# Patient Record
Sex: FEMALE | Race: BLACK OR AFRICAN AMERICAN | Employment: OTHER | URBAN - METROPOLITAN AREA
[De-identification: names, ages, dates, MRNs, and addresses within clinical notes are randomized per-mention and may not be internally consistent; named-entity substitution may affect disease eponyms.]

---

## 2017-04-26 ENCOUNTER — APPOINTMENT (OUTPATIENT)
Dept: GENERAL RADIOLOGY | Age: 77
End: 2017-04-26
Attending: INTERNAL MEDICINE
Payer: MEDICARE

## 2017-04-26 ENCOUNTER — HOSPITAL ENCOUNTER (OUTPATIENT)
Dept: NON INVASIVE DIAGNOSTICS | Age: 77
Discharge: HOME | End: 2017-04-27
Attending: INTERNAL MEDICINE | Admitting: INTERNAL MEDICINE
Payer: MEDICARE

## 2017-04-26 LAB
GLUCOSE BLD STRIP.AUTO-MCNC: 101 MG/DL (ref 65–100)
GLUCOSE BLD STRIP.AUTO-MCNC: 103 MG/DL (ref 65–100)
GLUCOSE BLD STRIP.AUTO-MCNC: 138 MG/DL (ref 65–100)
SERVICE CMNT-IMP: ABNORMAL

## 2017-04-26 PROCEDURE — C1892 INTRO/SHEATH,FIXED,PEEL-AWAY: HCPCS

## 2017-04-26 PROCEDURE — 74011000250 HC RX REV CODE- 250

## 2017-04-26 PROCEDURE — 74011250636 HC RX REV CODE- 250/636

## 2017-04-26 PROCEDURE — 74011250636 HC RX REV CODE- 250/636: Performed by: INTERNAL MEDICINE

## 2017-04-26 PROCEDURE — C1785 PMKR, DUAL, RATE-RESP: HCPCS

## 2017-04-26 PROCEDURE — 71010 XR CHEST PORT: CPT

## 2017-04-26 PROCEDURE — 77030031139 HC SUT VCRL2 J&J -A

## 2017-04-26 PROCEDURE — C1894 INTRO/SHEATH, NON-LASER: HCPCS

## 2017-04-26 PROCEDURE — 74011636320 HC RX REV CODE- 636/320

## 2017-04-26 PROCEDURE — 77030010507 HC ADH SKN DERMBND J&J -B

## 2017-04-26 PROCEDURE — 77030002996 HC SUT SLK J&J -A

## 2017-04-26 PROCEDURE — 33208 INSRT HEART PM ATRIAL & VENT: CPT

## 2017-04-26 PROCEDURE — L3670 SO ACRO/CLAV CAN WEB PRE OTS: HCPCS

## 2017-04-26 PROCEDURE — 82962 GLUCOSE BLOOD TEST: CPT

## 2017-04-26 PROCEDURE — C1898 LEAD, PMKR, OTHER THAN TRANS: HCPCS

## 2017-04-26 PROCEDURE — 77030018836 HC SOL IRR NACL ICUM -A

## 2017-04-26 RX ORDER — VANCOMYCIN HYDROCHLORIDE 1 G/20ML
INJECTION, POWDER, LYOPHILIZED, FOR SOLUTION INTRAVENOUS
Status: DISCONTINUED
Start: 2017-04-26 | End: 2017-04-26 | Stop reason: ALTCHOICE

## 2017-04-26 RX ORDER — OXYCODONE AND ACETAMINOPHEN 5; 325 MG/1; MG/1
1 TABLET ORAL
Status: DISCONTINUED | OUTPATIENT
Start: 2017-04-26 | End: 2017-04-27 | Stop reason: HOSPADM

## 2017-04-26 RX ORDER — MIDAZOLAM HYDROCHLORIDE 1 MG/ML
INJECTION, SOLUTION INTRAMUSCULAR; INTRAVENOUS
Status: COMPLETED
Start: 2017-04-26 | End: 2017-04-26

## 2017-04-26 RX ORDER — MIDAZOLAM HYDROCHLORIDE 1 MG/ML
1-5 INJECTION, SOLUTION INTRAMUSCULAR; INTRAVENOUS
Status: DISCONTINUED | OUTPATIENT
Start: 2017-04-26 | End: 2017-04-26 | Stop reason: ALTCHOICE

## 2017-04-26 RX ORDER — SODIUM CHLORIDE 900 MG/100ML
INJECTION INTRAVENOUS
Status: DISCONTINUED
Start: 2017-04-26 | End: 2017-04-26 | Stop reason: ALTCHOICE

## 2017-04-26 RX ORDER — SODIUM CHLORIDE 0.9 % (FLUSH) 0.9 %
5-10 SYRINGE (ML) INJECTION EVERY 8 HOURS
Status: DISCONTINUED | OUTPATIENT
Start: 2017-04-26 | End: 2017-04-27 | Stop reason: HOSPADM

## 2017-04-26 RX ORDER — FENTANYL CITRATE 50 UG/ML
INJECTION, SOLUTION INTRAMUSCULAR; INTRAVENOUS
Status: COMPLETED
Start: 2017-04-26 | End: 2017-04-26

## 2017-04-26 RX ORDER — BACITRACIN 50000 [IU]/1
50000 INJECTION, POWDER, FOR SOLUTION INTRAMUSCULAR ONCE
Status: COMPLETED | OUTPATIENT
Start: 2017-04-26 | End: 2017-04-26

## 2017-04-26 RX ORDER — ACETAMINOPHEN 325 MG/1
650 TABLET ORAL
Status: DISCONTINUED | OUTPATIENT
Start: 2017-04-26 | End: 2017-04-27 | Stop reason: HOSPADM

## 2017-04-26 RX ORDER — FENTANYL CITRATE 50 UG/ML
12.5-5 INJECTION, SOLUTION INTRAMUSCULAR; INTRAVENOUS
Status: DISCONTINUED | OUTPATIENT
Start: 2017-04-26 | End: 2017-04-26 | Stop reason: ALTCHOICE

## 2017-04-26 RX ORDER — LIDOCAINE HYDROCHLORIDE AND EPINEPHRINE 10; 10 MG/ML; UG/ML
1-20 INJECTION, SOLUTION INFILTRATION; PERINEURAL
Status: DISCONTINUED | OUTPATIENT
Start: 2017-04-26 | End: 2017-04-26 | Stop reason: ALTCHOICE

## 2017-04-26 RX ORDER — LIDOCAINE HYDROCHLORIDE AND EPINEPHRINE 10; 10 MG/ML; UG/ML
INJECTION, SOLUTION INFILTRATION; PERINEURAL
Status: COMPLETED
Start: 2017-04-26 | End: 2017-04-26

## 2017-04-26 RX ORDER — ONDANSETRON 2 MG/ML
4 INJECTION INTRAMUSCULAR; INTRAVENOUS
Status: DISCONTINUED | OUTPATIENT
Start: 2017-04-26 | End: 2017-04-27 | Stop reason: HOSPADM

## 2017-04-26 RX ORDER — HEPARIN SODIUM 200 [USP'U]/100ML
INJECTION, SOLUTION INTRAVENOUS
Status: COMPLETED
Start: 2017-04-26 | End: 2017-04-26

## 2017-04-26 RX ORDER — SODIUM CHLORIDE 0.9 % (FLUSH) 0.9 %
5-10 SYRINGE (ML) INJECTION AS NEEDED
Status: DISCONTINUED | OUTPATIENT
Start: 2017-04-26 | End: 2017-04-27 | Stop reason: HOSPADM

## 2017-04-26 RX ORDER — HEPARIN SODIUM 200 [USP'U]/100ML
500 INJECTION, SOLUTION INTRAVENOUS ONCE
Status: COMPLETED | OUTPATIENT
Start: 2017-04-26 | End: 2017-04-26

## 2017-04-26 RX ORDER — BACITRACIN 50000 [IU]/1
INJECTION, POWDER, FOR SOLUTION INTRAMUSCULAR
Status: COMPLETED
Start: 2017-04-26 | End: 2017-04-26

## 2017-04-26 RX ADMIN — MIDAZOLAM HYDROCHLORIDE 2 MG: 1 INJECTION, SOLUTION INTRAMUSCULAR; INTRAVENOUS at 17:25

## 2017-04-26 RX ADMIN — IOPAMIDOL 20 ML: 755 INJECTION, SOLUTION INTRAVENOUS at 17:38

## 2017-04-26 RX ADMIN — HEPARIN SODIUM 1000 UNITS: 200 INJECTION, SOLUTION INTRAVENOUS at 17:15

## 2017-04-26 RX ADMIN — MIDAZOLAM HYDROCHLORIDE 2 MG: 1 INJECTION INTRAMUSCULAR; INTRAVENOUS at 17:15

## 2017-04-26 RX ADMIN — FENTANYL CITRATE 50 MCG: 50 INJECTION, SOLUTION INTRAMUSCULAR; INTRAVENOUS at 17:15

## 2017-04-26 RX ADMIN — BACITRACIN 50000 UNITS: 50000 INJECTION, POWDER, FOR SOLUTION INTRAMUSCULAR at 18:06

## 2017-04-26 RX ADMIN — VANCOMYCIN HYDROCHLORIDE 1000 MG: 1 INJECTION, POWDER, LYOPHILIZED, FOR SOLUTION INTRAVENOUS at 17:10

## 2017-04-26 RX ADMIN — MIDAZOLAM HYDROCHLORIDE 2 MG: 1 INJECTION, SOLUTION INTRAMUSCULAR; INTRAVENOUS at 17:43

## 2017-04-26 RX ADMIN — MIDAZOLAM HYDROCHLORIDE 1 MG: 1 INJECTION, SOLUTION INTRAMUSCULAR; INTRAVENOUS at 17:37

## 2017-04-26 RX ADMIN — MIDAZOLAM HYDROCHLORIDE 2 MG: 1 INJECTION, SOLUTION INTRAMUSCULAR; INTRAVENOUS at 18:05

## 2017-04-26 RX ADMIN — FENTANYL CITRATE 50 MCG: 50 INJECTION, SOLUTION INTRAMUSCULAR; INTRAVENOUS at 17:37

## 2017-04-26 RX ADMIN — LIDOCAINE HYDROCHLORIDE AND EPINEPHRINE 20 ML: 10; 10 INJECTION, SOLUTION INFILTRATION; PERINEURAL at 17:39

## 2017-04-26 RX ADMIN — LIDOCAINE HYDROCHLORIDE,EPINEPHRINE BITARTRATE 20 ML: 10; .01 INJECTION, SOLUTION INFILTRATION; PERINEURAL at 17:39

## 2017-04-26 RX ADMIN — BACITRACIN 50000 UNITS: 5000 INJECTION, POWDER, FOR SOLUTION INTRAMUSCULAR at 18:06

## 2017-04-26 RX ADMIN — MIDAZOLAM HYDROCHLORIDE 2 MG: 1 INJECTION, SOLUTION INTRAMUSCULAR; INTRAVENOUS at 17:15

## 2017-04-26 NOTE — PROCEDURES
[address]  [phone]    Patient ID:  Patient: Brandy Mccann  MRN: [id number]  Age: 78 y.o.  : 1940  Gender: female  Study Date: 2017    History: This is a female with nonreversible symptomatic sinus node dysfunction here for permanent pacemaker implant. Procedures Performed:  1. DUAL CHAMBER PACEMAKER (98250)    The patient was brought to the EP lab in a postabsorptive state after informed consent had been previously obtained. Continuous electrocardiographic and hemodynamic monitoring was performed. Sedation was performed by the nurse who was in constant attendance and my supervision throughout the procedure. Versed 9 mg and fentanyl 100 mcg was used, sedation start time 17:10, end time 18:10 (60 minutes). Using 1% lidocaine with epinephrine, the right chest site was anesthetized. The pocket was formed in the usual fashion and ultimately axillary venous access was obtained using a micropuncture needle. Contrast venography of the right upper extremity was used to facilitate (10 cc). Two safe sheaths were placed. The right ventricular lead (Solia S 53) was advanced to the RV apex. The right atrial lead (Solia S 45) was advanced to the RA appendage. Each lead was fixed and tested, performance verified. The leads were anchored to the pocket floor using two 0-silk sutures at each anchor sleeve. The pulse generator was connected to the leads and placed in the pocket after hemostasis was confirmed. Vigorous irrigation with antibiotic solution was performed. A single 0-silk suture was used to anchor the pulse generator to the pocket floor. The pocket was closed using a running 2-0 Vicryl layer x1, followed by a more superficial layer of running 4-0 Vicryl in a subcuticular fashion to close the skin. Final fluoroscopic check revealed adequate redundancy of the leads and the absence of pneumothorax.   Dermabond was applied. Preoperative Diagnosis: As above. Postoperative Diagnosis: As above. Procedure:  As above. Surgeon(s) and Role:  Smita Wright MD - Primary   Anesthesia:   MAC. Estimated Blood Loss:  <5 cc. Specimens: * No specimens in log *   Findings:  As below. Complications:  None. SETTINGS:  Kaiima 8 SAL, [id number]  P 2.4, 1.1, 455  R 13.7, 1.2, 788  DDD , CLS algorithm enabled    X-ray time:  9.2 minutes    Recommendations:  After successful dual chamber permanent pacemaker implant to the right chest using transvenous leads, routine follow-up in 2-4 weeks for wound and device check.

## 2017-04-26 NOTE — IP AVS SNAPSHOT
[address] [facility] 
[phone] Patient: Cheryl Ramos MRN: [id number] :1940 You are allergic to the following Allergen Reactions Eggshell Membrane Other (comments)  
 vomiting Penicillins Other (comments) Tree Nuts Itching Swelling and tingling of tongue Recent Documentation Height Weight Breastfeeding? BMI OB Status Smoking Status 1.575 m 66.2 kg No 26.7 kg/m2 Postmenopausal Never Smoker Emergency Contacts Name Discharge Info Relation Home Work Mobile Shawnee Haji CAREGIVER [3] Friend [5]   [phone] About your hospitalization You were admitted on:  2017 You last received care in the:  [facility] You were discharged on:  2017 Unit phone number:  [phone] Why you were hospitalized Your primary diagnosis was:  Not on File Providers Seen During Your Hospitalizations Provider Role Specialty Primary office phone Juanita Cantu MD Attending Provider Cardiology [phone] Your Primary Care Physician (PCP) Primary Care Physician Office Phone Office Fax [address] [phone] Follow-up Information Follow up With Details Comments Contact Info [address] [place] [address] Avenue 
[phone] Juanita Cantu MD  Please call and make a follow-up apponitment with Dr. Alex Michaud in 2-4 weeks. Thank you! [address] [facility] 
[phone] Current Discharge Medication List  
  
CONTINUE these medications which have NOT CHANGED Dose & Instructions Dispensing Information Comments Morning Noon Evening Bedtime  
 aspirin 81 mg chewable tablet Your last dose was: Your next dose is:    
   
   
 Dose:  81 mg Take 81 mg by mouth daily. Refills:  0  
     
   
   
   
  
 simvastatin 10 mg tablet Commonly known as:  ZOCOR Your last dose was: Your next dose is:    
   
   
 Dose:  10 mg Take 10 mg by mouth nightly. Refills:  0  
     
   
   
   
  
 valsartan-hydroCHLOROthiazide 80-12.5 mg per tablet Commonly known as:  DIOVAN-HCT Your last dose was: Your next dose is:    
   
   
 Dose:  1 Tab Take 1 Tab by mouth daily. Indications: HYPERTENSION Refills:  0 Discharge Instructions DISCHARGE INSTRUCTIONS FOR PATIENTS WITH PACEMAKERS You had a dual chamber Biotronik pacemaker implanted by Dr. Josh Clement on 4/26 due to a slow heart rate problem called sinus node dysfunction. 1. Remember to call for an appointment in 2-4 weeks [phone] to check healing and implant programming with Dr. Preston Matute nurse. 2. Medic Alert Bracelets are available from your pharmacist to wear at all times if you choose to wear one. 3. Carry your ID card for pacemaker with you at all times. This card will be given to you in the hospital or mailed to you. 4. The pacemaker will bulge slightly under your skin. The bulge will decrease in size over the next few weeks. Please notify the doctor's office if you notice any of the following around your site: A.  A bruise that does not go away. B.  Soreness or yellow, green, or brown drainage from the site. C. Any swelling from the site. D. If you have a fever of 100 degrees or higher that lasts for a few days. INCISION CARE 1.  Leave skin glue over your site until it starts to fall off, usually in a few weeks. 2.  You may shower after 3 days as long as your incision isnt submerged or directly sprayed upon until well healed. 3.  For comfort, wear loose fitting clothing.  
4.  Report any signs of infection, fever, pain, swelling, redness, oozing, or heat at site especially if these symptoms increase after the first 3 to 4 days. ACTIVITY PRECAUTIONS 1. Avoid rough contact with the implant site. 2. No driving for 14 days. 3. Avoid lifting your arm over your head, carrying anything on the affected side, or lifting over 10 pounds for 30 days. For the first 2 days only bend your arm at the elbow. 4. Any extreme activity such as golf, weight lifting or exercise biking should be restricted for 60 days. 5. Do not carry objects by holding them against your implant site. 6.  No shooting rifles or any type of gun with the affected shoulder permanently. SPECIAL PRECAUTIONS 1. You should avoid all strong magnetic fields, such as arc welding, large transformers, large motors. 2.  You may not have an MRI which uses a strong magnet to take pictures for 6 weeks. After this, MRI scanning is possible provided a protocol is followed. 3.  Treatments or surgery that requires diathermy or electrocautery should be discussed with your doctor before scheduled. 4. Avoid radio frequency transmitters, including radar. 5. Advise dentist or other medical personnel you see that you have a pacemaker. 6.  Cell phones and microwave oven use is okay. 7.  If you plan to move or take a trip to a new area, the doctor's office will give you a name of a doctor to contact for any problems. ANTIBIOTIC THERAPY During the first 8 weeks after your pacemaker insertion, you may need antibiotics before any dental work or certain tests or operations. Let the dentist or doctor who is caring for you know that you have had an implanted device. Discharge Orders None Introducing [facility]! King Kaela introduces [employer] patient portal. Now you can access parts of your medical record, email your doctor's office, and request medication refills online. 1. In your internet browser, go to [email or web address] 2. Click on the First Time User? Click Here link in the Sign In box. You will see the New Member Sign Up page. 3. Enter your [employer] Access Code exactly as it appears below. You will not need to use this code after youve completed the sign-up process. If you do not sign up before the expiration date, you must request a new code. · [employer] Access Code: [email or web address] Expires: 6/27/2017  2:23 PM 
 
4. Enter the last four digits of your Social Security Number (xxxx) and Date of Birth (mm/dd/yyyy) as indicated and click Submit. You will be taken to the next sign-up page. 5. Create a [employer] ID. This will be your [employer] login ID and cannot be changed, so think of one that is secure and easy to remember. 6. Create a [employer] password. You can change your password at any time. 7. Enter your Password Reset Question and Answer. This can be used at a later time if you forget your password. 8. Enter your e-mail address. You will receive e-mail notification when new information is available in [address] 9. Click Sign Up. You can now view and download portions of your medical record. 10. Click the Download Summary menu link to download a portable copy of your medical information. If you have questions, please visit the Frequently Asked Questions section of the [employer] website. Remember, [employer] is NOT to be used for urgent needs. For medical emergencies, dial 911. Now available from your iPhone and Android! General Information Please provide this summary of care documentation to your next provider. Patient Signature:  ____________________________________________________________ Date:  ____________________________________________________________  
  
Mirta Su Provider Signature:  ____________________________________________________________ Date:  ____________________________________________________________

## 2017-04-26 NOTE — PROGRESS NOTES
EP/ End of Procedure/ TRANSFER - OUT REPORT:    Verbal report given to LPM,RN on Cheryl Ramos for routine progression of care       Report consisted of patients Situation, Background, Assessment and   Recommendations(SBAR). Information from the following report(s) SBAR, Kardex, Procedure Summary and MAR was reviewed with the receiving nurse. Opportunity for questions and clarification was provided.

## 2017-04-27 VITALS
WEIGHT: 146 LBS | OXYGEN SATURATION: 100 % | RESPIRATION RATE: 16 BRPM | HEART RATE: 65 BPM | SYSTOLIC BLOOD PRESSURE: 129 MMHG | BODY MASS INDEX: 26.87 KG/M2 | DIASTOLIC BLOOD PRESSURE: 79 MMHG | HEIGHT: 62 IN | TEMPERATURE: 98.3 F

## 2017-04-27 LAB
GLUCOSE BLD STRIP.AUTO-MCNC: 120 MG/DL (ref 65–100)
SERVICE CMNT-IMP: ABNORMAL

## 2017-04-27 PROCEDURE — 82962 GLUCOSE BLOOD TEST: CPT

## 2017-04-27 PROCEDURE — 74011250637 HC RX REV CODE- 250/637: Performed by: INTERNAL MEDICINE

## 2017-04-27 RX ADMIN — HYDROCHLOROTHIAZIDE: 25 TABLET ORAL at 08:33

## 2017-04-27 NOTE — PROGRESS NOTES
Patient ambulated in hallway without difficulty. Dressing CDI. No complaints. Discharge instructions reviewed with patient; to be discharged to home in a cab. Site care instructions reviewed; site(s) CDI. Patient instructed on which medications to continue. Medication info provided and reviewed with patient. Follow-up appointment information given; follow-up appointment to be made by patient. IV and tele box removed. Opportunity for questions provided; all questions answered. All belongings returned. Patient wheeled to front door via wheelchair by volunteer; to be transported home by the .

## 2017-04-27 NOTE — DISCHARGE INSTRUCTIONS
DISCHARGE INSTRUCTIONS FOR PATIENTS WITH PACEMAKERS    You had a dual chamber Biotronik pacemaker implanted by Dr. Rachel Fleming on 4/26 due to a slow heart rate problem called sinus node dysfunction. 1. Remember to call for an appointment in 2-4 weeks [phone] to check healing and implant programming with Dr. Shama Oh nurse. 2. Medic Alert Bracelets are available from your pharmacist to wear at all times if you choose to wear one. 3. Carry your ID card for pacemaker with you at all times. This card will be given to you in the hospital or mailed to you. 4. The pacemaker will bulge slightly under your skin. The bulge will decrease in size over the next few weeks. Please notify the doctor's office if you notice any of the following around your site:   A.  A bruise that does not go away. B.  Soreness or yellow, green, or brown drainage from the site. C. Any swelling from the site. D. If you have a fever of 100 degrees or higher that lasts for a few days. INCISION CARE       1.  Leave skin glue over your site until it starts to fall off, usually in a few weeks. 2.  You may shower after 3 days as long as your incision isnt submerged or directly sprayed upon until well healed. 3.  For comfort, wear loose fitting clothing. 4.  Report any signs of infection, fever, pain, swelling, redness, oozing, or heat at site especially if these symptoms increase after the first 3 to 4 days. ACTIVITY PRECAUTIONS     1. Avoid rough contact with the implant site. 2. No driving for 14 days. 3. Avoid lifting your arm over your head, carrying anything on the affected side, or lifting over 10 pounds for 30 days. For the first 2 days only bend your arm at the elbow. 4. Any extreme activity such as golf, weight lifting or exercise biking should be restricted for 60 days. 5. Do not carry objects by holding them against your implant site.    6.  No shooting rifles or any type of gun with the affected shoulder permanently. SPECIAL PRECAUTIONS     1. You should avoid all strong magnetic fields, such as arc welding, large transformers, large motors. 2.  You may not have an MRI which uses a strong magnet to take pictures for 6 weeks. After this, MRI scanning is possible provided a protocol is followed. 3.  Treatments or surgery that requires diathermy or electrocautery should be discussed with your doctor before scheduled. 4. Avoid radio frequency transmitters, including radar. 5. Advise dentist or other medical personnel you see that you have a pacemaker. 6.  Cell phones and microwave oven use is okay. 7.  If you plan to move or take a trip to a new area, the doctor's office will give you a name of a doctor to contact for any problems. ANTIBIOTIC THERAPY    During the first 8 weeks after your pacemaker insertion, you may need antibiotics before any dental work or certain tests or operations. Let the dentist or doctor who is caring for you know that you have had an implanted device.

## 2017-04-27 NOTE — PROGRESS NOTES
Patient with uneventful night. Paced on tele monitor. Right chest wall incisional site remains benign. Patient denies pain, SOB, nausea. Voiding, ambulating, tolerating p.o. Intake.

## 2017-04-27 NOTE — PROGRESS NOTES
POD#1 site and device programming check OK. S/p dual chamber Biotronik pacemaker yesterday. Dermabond intact. No hematoma. Ambulatory and taking oral.      Visit Vitals    /79    Pulse 65    Temp 98.3 °F (36.8 °C)    Resp 16    Ht 5' 2\" (1.575 m)    Wt 66.2 kg (146 lb)    SpO2 100%    Breastfeeding No    BMI 26.7 kg/m2       ND, NAD. R chest site OK. RRR, no rub. Lungs CTAB. No unilateral arm edema. Awake, appropriate, neuro grossly nonfocal.      PLAN:  Discharge to home with F/U in the office for wound and device programming check. All questions answered. Patient is aware of signs and sx warranting urgent med F/U or calling 911.

## 2023-05-09 NOTE — CARDIO/PULMONARY
Cardiopulmonary Rehab Nursing Entry:    Chart reviewed and pt visited for pacemaker placement teaching. Pt s/p dual chamber PPM due to symptomatic SA node dysfunction. Printed material given and discussed re: pacemakers, pacemaker discharge instructions and the TLC diet. Discussed post pacemaker instructions including: restrictions for the affected arm (no raising the arm above shoulder level, no heavy lifting for 30 days), monitoring for infection, avoiding impacts/pressure to the site, avoiding extreme activities, when to call the doctor, use of cell phones and microwaves and avoiding strong magnetic fields. Encouraged to clarify with MD if device MRI compatible. Pt verbalized understanding.
.